# Patient Record
Sex: FEMALE | Race: WHITE
[De-identification: names, ages, dates, MRNs, and addresses within clinical notes are randomized per-mention and may not be internally consistent; named-entity substitution may affect disease eponyms.]

---

## 2018-04-14 ENCOUNTER — HOSPITAL ENCOUNTER (INPATIENT)
Dept: HOSPITAL 80 - FED | Age: 28
LOS: 3 days | Discharge: HOME | DRG: 482 | End: 2018-04-17
Attending: SURGERY | Admitting: SURGERY
Payer: COMMERCIAL

## 2018-04-14 DIAGNOSIS — F90.9: ICD-10-CM

## 2018-04-14 DIAGNOSIS — J45.909: ICD-10-CM

## 2018-04-14 DIAGNOSIS — S72.321A: Primary | ICD-10-CM
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
## 2018-04-14 DIAGNOSIS — E06.3: ICD-10-CM
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
## 2018-04-14 PROCEDURE — G0378 HOSPITAL OBSERVATION PER HR: HCPCS

## 2018-04-14 PROCEDURE — C1713 ANCHOR/SCREW BN/BN,TIS/BN: HCPCS

## 2018-04-14 PROCEDURE — 0QS806Z REPOSITION RIGHT FEMORAL SHAFT WITH INTRAMEDULLARY INTERNAL FIXATION DEVICE, OPEN APPROACH: ICD-10-PCS | Performed by: ORTHOPAEDIC SURGERY

## 2018-04-14 RX ADMIN — Medication PRN MG: at 18:04

## 2018-04-14 RX ADMIN — Medication PRN MCG: at 21:35

## 2018-04-14 RX ADMIN — Medication PRN MG: at 17:57

## 2018-04-14 RX ADMIN — Medication PRN MCG: at 22:02

## 2018-04-14 RX ADMIN — Medication PRN MG: at 15:35
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
## 2018-04-14 NOTE — PDANEPAE
ANE History of Present Illness





here for R TFN





ANE Past Medical History





- Cardiovascular History


Hx Hypertension: No


Hx Arrhythmias: No


Hx Chest Pain: No


Hx Coronary Artery / Peripheral Vascular Disease: No


Hx CHF / Valvular Disease: No


Hx Palpitations: No





- Pulmonary History


Hx COPD: No


Hx Asthma/Reactive Airway Disease: Yes


Hx Recent Upper Respiratory Infection: No


Hx Oxygen in Use at Home: No


Hx Sleep Apnea: No





- Endocrine History


Hx Diabetes: No


Hypothyroid: No


Hyperthyroid: Yes





- Renal History


Hx Renal Disorders: No





- Liver History


Hx Hepatic Disorders: No





- Neurological & Psychiatric Hx


Hx Neurological and Psychiatric Disorders: No





ANE Review of Systems


Review of systems is: negative


Review of Systems: 








ANE Patient History





- Allergies


Allergies/Adverse Reactions: 








amoxicillin Allergy (Verified 04/14/18 14:27)


 


Penicillins Allergy (Verified 04/14/18 14:27)


 








- Home Medications


Home medications: home medication list seen and reviewed


Home Medications: 








Levothyroxine [Synthroid 100 mcg (*)] 100 mcg PO DAILY06 04/14/18 [Last Taken 04 /14/18]


Methylphenidate HCl [Concerta] 18 mg PO DAILY PRN 04/14/18 [Last Taken 04/13/18]








- NPO status


NPO Status: no food or drink >8 hours


NPO Since - Liquids (Date): 04/14/18


NPO Since - Liquids (Time): 10:00


NPO Since - Solids (Date): 04/14/18


NPO Since - Solids (Time): 09:00





- Smoking Hx


Smoking Status: Never smoked





ANE Labs/Vital Signs





- Vital Signs


Vital Signs: reviewed preoperatively; see RN documention for details


Blood Pressure: 117/79


Heart Rate: 98


Respiratory Rate: 20


O2 Sat (%): 95


Height: 172.72 cm


Weight: 60 kg





ANE Physical Exam





- Airway


Neck exam: FROM


Mallampati Score: Class 1





- Pulmonary


Pulmonary: no respiratory distress





- Cardiovascular


Cardiovascular: regular rate and rhythym





- ASA Status


ASA Status: II





ANE Anesthesia Plan


Anesthesia Plan: general endotracheal anesthesia


Urgent/Emergent Case: Tommie barbour completed preop but documented later for safe 

timely pt care

## 2018-04-14 NOTE — POSTANESTH
Post Anesthetic Evaluation


Cardiovascular Status: Normal, Stable


Respiratory Status: Normal, Stable


Level of Consciousness/Mental Status: Can Participate in Eval


Pain Control: Inadeq, Add Tx Required


Nausea/Vomiting Control: Adequate, Prn Tx Ordered


Complications Possibly Related to Anesthesia: None Noted
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
## 2018-04-14 NOTE — POSTOPPROG
Post Op Note


Date of Operation: 04/14/18


Surgeon: Delvin Machado


Anesthesiologist: MD Jose Manuel


Anesthesia: GET(General Endotracheal)


Pre-op Diagnosis: Right transverse distal diaphyseal femoral fracture


Post-op Diagnosis: same


Procedure: Right femur closed reduction IMN


Inf/Abcess present in the surg proc area at time of surgery?: No


EBL:

## 2018-04-15 RX ADMIN — CLINDAMYCIN PHOSPHATE SCH MLS: 18 INJECTION, SOLUTION INTRAVENOUS at 17:56

## 2018-04-15 RX ADMIN — HYDROCODONE BITARTRATE AND ACETAMINOPHEN PRN TAB: 5; 325 TABLET ORAL at 00:03

## 2018-04-15 RX ADMIN — CLINDAMYCIN PHOSPHATE SCH MLS: 18 INJECTION, SOLUTION INTRAVENOUS at 02:30

## 2018-04-15 RX ADMIN — CLINDAMYCIN PHOSPHATE SCH MLS: 18 INJECTION, SOLUTION INTRAVENOUS at 09:35

## 2018-04-15 RX ADMIN — Medication PRN MG: at 02:30

## 2018-04-15 RX ADMIN — Medication PRN MG: at 10:33

## 2018-04-15 RX ADMIN — DIAZEPAM PRN MG: 5 TABLET ORAL at 20:43

## 2018-04-15 RX ADMIN — Medication PRN MG: at 18:42

## 2018-04-15 RX ADMIN — HYDROCODONE BITARTRATE AND ACETAMINOPHEN PRN TAB: 5; 325 TABLET ORAL at 20:43

## 2018-04-15 RX ADMIN — HYDROCODONE BITARTRATE AND ACETAMINOPHEN PRN TAB: 5; 325 TABLET ORAL at 14:02

## 2018-04-15 RX ADMIN — HYDROCODONE BITARTRATE AND ACETAMINOPHEN PRN TAB: 5; 325 TABLET ORAL at 00:32

## 2018-04-15 RX ADMIN — DIAZEPAM PRN MG: 5 TABLET ORAL at 09:35

## 2018-04-15 RX ADMIN — LEVOTHYROXINE SODIUM SCH MCG: 100 TABLET ORAL at 04:40

## 2018-04-15 RX ADMIN — ENOXAPARIN SODIUM SCH MG: 100 INJECTION SUBCUTANEOUS at 09:35

## 2018-04-15 RX ADMIN — Medication PRN MG: at 14:08

## 2018-04-15 RX ADMIN — HYDROCODONE BITARTRATE AND ACETAMINOPHEN PRN TAB: 5; 325 TABLET ORAL at 08:08

## 2018-04-15 RX ADMIN — Medication PRN MG: at 04:40

## 2018-04-15 NOTE — ASMTCMCOM
CM Note

 

CM Note                       

Notes:

Pt had surgery for femur fx. Awaiting PT/OT notes. CM to follow.

 

Date Signed:  04/15/2018 12:29 PM

Electronically Signed By:Anika Velarde LCSW
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
## 2018-04-15 NOTE — SOAPPROG
SOAP Progress Note


Assessment/Plan: 


Assessment:  Postop day 1 status post right femur intramedullary nailing





























Plan:


Weight bear as tolerated with assistance, PTOT


Analgesics as needed, wean off narcotics as tolerated, the Valium as needed for 

muscle spasms


Lovenox, SCDs, Stanley's for DVT prophylaxis


Incentive spirometry 10 times per hour


Home today if pain controlled


04/15/18 12:59





Subjective: 


No acute events overnight.  Pain relatively well controlled.  Having some 

muscle spasms.  Foot pain is completely resolved.  Denies fevers chills nausea 

vomiting chest pain shortness of breath numbness or tingling





Objective: 





 Vital Signs











Temp Pulse Resp BP Pulse Ox


 


 36.9 C   74   14   100/52 L  98 


 


 04/15/18 11:35  04/15/18 11:35  04/15/18 11:35  04/15/18 11:35  04/15/18 11:35








 











 04/14/18 04/15/18 04/16/18





 05:59 05:59 05:59


 


Intake Total  3975 500


 


Output Total  1600 1800


 


Balance  2375 -1300








Awake alert and oriented x3 no acute distress


Easy nonlabored breathing


Right lower extremity incisions clean dry intact no erythema drainage or signs 

of infection


Thigh compartments:  But compressible moderately tender


Sensation intact to light touch L3-S1


Motor intact EHL FHL tibialis anterior gastrocsoleus


No calf pain


Palpable DP PT pulses





- Time Spent With Patient


Time Spent With Patient: 





20





- Pending Discharge


Pending Discharge Within 24 Hours: Yes


Pending Discharge Date: 04/15/18


Pending Discharge Time: 17:00





ICD10 Worksheet


Patient Problems: 


 Problems











Problem Status Onset


 


Femur fracture, right Acute  














- ICD10 Problem Qualifiers


(1) Femur fracture, right
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
## 2018-04-16 LAB — PLATELET # BLD: 165 10^3/UL (ref 150–400)

## 2018-04-16 RX ADMIN — ENOXAPARIN SODIUM SCH MG: 100 INJECTION SUBCUTANEOUS at 15:57

## 2018-04-16 RX ADMIN — PREGABALIN SCH MG: 75 CAPSULE ORAL at 15:57

## 2018-04-16 RX ADMIN — HYDROCODONE BITARTRATE AND ACETAMINOPHEN PRN TAB: 5; 325 TABLET ORAL at 02:45

## 2018-04-16 RX ADMIN — CLINDAMYCIN PHOSPHATE SCH MLS: 18 INJECTION, SOLUTION INTRAVENOUS at 02:10

## 2018-04-16 RX ADMIN — CYCLOBENZAPRINE HYDROCHLORIDE PRN MG: 10 TABLET, FILM COATED ORAL at 12:46

## 2018-04-16 RX ADMIN — Medication PRN TAB: at 20:24

## 2018-04-16 RX ADMIN — DIAZEPAM PRN MG: 5 TABLET ORAL at 07:34

## 2018-04-16 RX ADMIN — CYCLOBENZAPRINE HYDROCHLORIDE PRN MG: 10 TABLET, FILM COATED ORAL at 15:57

## 2018-04-16 RX ADMIN — Medication PRN MG: at 08:43

## 2018-04-16 RX ADMIN — CLINDAMYCIN PHOSPHATE SCH: 18 INJECTION, SOLUTION INTRAVENOUS at 11:24

## 2018-04-16 RX ADMIN — LEVOTHYROXINE SODIUM SCH MCG: 100 TABLET ORAL at 05:54
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
## 2018-04-16 NOTE — SOAPPROG
SOAP Progress Note


Assessment/Plan: 


Assessment:  Postop day 2 status post right femur intramedullary nailing





























Plan:


Decreased tolerance to pain.  Analgesic regimen modified to Norco 10/325, IV 

Dilaudid as needed, switch Valium to Flexeril 5 mg three times daily, add 

Lyrica 75 mg twice daily


Weight bear as tolerated with assistance, PTOT


Lovenox, SCDs, Stanley's for DVT prophylaxis


Incentive spirometry 10 times per hour


Home today if pain controlled


04/15/18 12:59





04/16/18 12:33





04/16/18 12:42





Subjective: 


Patient with increased pain today.  Did sleep 6 hr last night however.  Unable 

to adequately participate and partake in physical therapy today.  She denies 

numbness tingling fevers chills nausea vomiting chest pain or shortness of 

breath.  States she is feeling "stoned" from her pain medications.  Has only 

been up once today and was less successful than yesterday.





Objective: 





 Vital Signs











Temp Pulse Resp BP Pulse Ox


 


 37.0 C   82   18   98/53 L  100 


 


 04/16/18 12:00  04/16/18 12:00  04/16/18 12:00  04/16/18 12:00  04/16/18 12:00








 Laboratory Results





 04/16/18 04:18 





 











 04/15/18 04/16/18 04/17/18





 05:59 05:59 05:59


 


Intake Total 3975 2000 20


 


Output Total 1600 2900 450


 


Balance 2375 -900 -430








Awake alert and oriented x3, lethargic, 


Easy nonlabored breathing


Right lower extremity incisions clean dry intact no erythema drainage or signs 

of infection


Thigh compartments:  swollen but soft and compressible moderately tender mostly 

at fracture site


Sensation intact to light touch L3-S1


Motor intact EHL FHL tibialis anterior gastrocsoleus


No calf pain


Palpable DP PT pulses





- Time Spent With Patient


Time Spent With Patient: 





20





ICD10 Worksheet


Patient Problems: 


 Problems











Problem Status Onset


 


Femur fracture, right Acute  














- ICD10 Problem Qualifiers


(1) Femur fracture, right

## 2018-04-17 VITALS — DIASTOLIC BLOOD PRESSURE: 63 MMHG | SYSTOLIC BLOOD PRESSURE: 100 MMHG

## 2018-04-17 RX ADMIN — PREGABALIN SCH MG: 75 CAPSULE ORAL at 00:57

## 2018-04-17 RX ADMIN — ENOXAPARIN SODIUM SCH MG: 100 INJECTION SUBCUTANEOUS at 08:55

## 2018-04-17 RX ADMIN — PREGABALIN SCH MG: 75 CAPSULE ORAL at 08:55

## 2018-04-17 RX ADMIN — LEVOTHYROXINE SODIUM SCH MCG: 100 TABLET ORAL at 06:15

## 2018-04-17 RX ADMIN — Medication PRN TAB: at 12:41

## 2018-04-17 RX ADMIN — Medication PRN TAB: at 06:15

## 2018-04-17 NOTE — ASMTCMCOM
CM Note

 

CM Note                       

Notes:

Pt medically stable for d/c with family support, no CM d/c needs identified. 

 

Date Signed:  04/17/2018 01:57 PM

Electronically Signed By:ELIZABETH Reagan

## 2018-04-17 NOTE — SOAPPROG
SOAP Progress Note


Assessment/Plan: 


Assessment:  Postop day 3 status post right femur intramedullary nailing





























Plan:


Significant improvement in pain and symptoms with new analgesic regimen 

modified to Norco 10/325, Flexeril 5 mg three times daily, and Lyrica 75 mg 

twice daily. Pt has only require Norco once 


Weight bear as tolerated with assistance, PTOT


Lovenox x6wks unless patient demonstrate adequate mobility at first postop visit

, SCDs, Stanley's for DVT prophylaxis


Incentive spirometry 10 times per hour


Home today








04/17/18 09:40





Subjective: 


No acute events overnight. Pt "feels like a new person" today. New pain regimen 

has dramatically helped her pain and symptoms. Has been ambulating much more 

successfully. denies f/c/cp/sob/n/v/numbness/tingling. Has not had BM as of 

yet. 





Objective: 





 Vital Signs











Temp Pulse Resp BP Pulse Ox


 


 36.4 C   99   16   100/63   99 


 


 04/17/18 12:00  04/17/18 12:00  04/17/18 12:00  04/17/18 12:00  04/17/18 12:00








 Laboratory Results





 04/16/18 04:18 





 











 04/16/18 04/17/18 04/18/18





 05:59 05:59 05:59


 


Intake Total 2000 470 


 


Output Total 2900 3050 


 


Balance -900 -2580 








Awake alert and oriented x3, lethargic, 


Easy nonlabored breathing


Right lower extremity incisions clean dry intact no erythema drainage or signs 

of infection


Thigh compartments:  mild swelling but soft and compressible mildly tender at 

fracture site


Sensation intact to light touch L3-S1


Motor intact EHL FHL tibialis anterior gastrocsoleus


No calf pain


Palpable DP PT pulses





- Time Spent With Patient


Time Spent With Patient: 





15





ICD10 Worksheet


Patient Problems: 


 Problems











Problem Status Onset


 


Femur fracture, right Acute  














- ICD10 Problem Qualifiers


(1) Femur fracture, right
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
